# Patient Record
Sex: MALE | Race: ASIAN | NOT HISPANIC OR LATINO | URBAN - METROPOLITAN AREA
[De-identification: names, ages, dates, MRNs, and addresses within clinical notes are randomized per-mention and may not be internally consistent; named-entity substitution may affect disease eponyms.]

---

## 2017-09-05 ENCOUNTER — CONSULT (OUTPATIENT)
Dept: ONCOLOGY | Facility: CLINIC | Age: 77
End: 2017-09-05

## 2017-09-05 ENCOUNTER — LAB (OUTPATIENT)
Dept: LAB | Facility: HOSPITAL | Age: 77
End: 2017-09-05

## 2017-09-05 VITALS
DIASTOLIC BLOOD PRESSURE: 62 MMHG | HEIGHT: 66 IN | BODY MASS INDEX: 23.46 KG/M2 | WEIGHT: 146 LBS | HEART RATE: 70 BPM | SYSTOLIC BLOOD PRESSURE: 117 MMHG | RESPIRATION RATE: 20 BRPM | TEMPERATURE: 97.7 F

## 2017-09-05 DIAGNOSIS — C71.9 GBM (GLIOBLASTOMA MULTIFORME) (HCC): ICD-10-CM

## 2017-09-05 DIAGNOSIS — C71.9 GBM (GLIOBLASTOMA MULTIFORME) (HCC): Primary | ICD-10-CM

## 2017-09-05 LAB
ALBUMIN SERPL-MCNC: 3.9 G/DL (ref 3.2–4.8)
ALBUMIN/GLOB SERPL: 1.3 G/DL (ref 1.5–2.5)
ALP SERPL-CCNC: 75 U/L (ref 25–100)
ALT SERPL W P-5'-P-CCNC: 26 U/L (ref 7–40)
ANION GAP SERPL CALCULATED.3IONS-SCNC: 4 MMOL/L (ref 3–11)
AST SERPL-CCNC: 33 U/L (ref 0–33)
BILIRUB SERPL-MCNC: 0.4 MG/DL (ref 0.3–1.2)
BUN BLD-MCNC: 24 MG/DL (ref 9–23)
BUN/CREAT SERPL: 40 (ref 7–25)
CALCIUM SPEC-SCNC: 10.9 MG/DL (ref 8.7–10.4)
CHLORIDE SERPL-SCNC: 106 MMOL/L (ref 99–109)
CO2 SERPL-SCNC: 30 MMOL/L (ref 20–31)
CREAT BLD-MCNC: 0.6 MG/DL (ref 0.6–1.3)
ERYTHROCYTE [DISTWIDTH] IN BLOOD BY AUTOMATED COUNT: 20.7 % (ref 11.3–14.5)
GFR SERPL CREATININE-BSD FRML MDRD: 130 ML/MIN/1.73
GFR SERPL CREATININE-BSD FRML MDRD: >150 ML/MIN/1.73
GLOBULIN UR ELPH-MCNC: 3 GM/DL
GLUCOSE BLD-MCNC: 192 MG/DL (ref 70–100)
HCT VFR BLD AUTO: 33.7 % (ref 38.9–50.9)
HGB BLD-MCNC: 11 G/DL (ref 13.1–17.5)
LYMPHOCYTES # BLD AUTO: 1 10*3/MM3 (ref 0.6–4.8)
LYMPHOCYTES NFR BLD AUTO: 19.5 % (ref 24–44)
MCH RBC QN AUTO: 31.7 PG (ref 27–31)
MCHC RBC AUTO-ENTMCNC: 32.5 G/DL (ref 32–36)
MCV RBC AUTO: 97.4 FL (ref 80–99)
MONOCYTES # BLD AUTO: 0.3 10*3/MM3 (ref 0–1)
MONOCYTES NFR BLD AUTO: 5.8 % (ref 0–12)
NEUTROPHILS # BLD AUTO: 3.7 10*3/MM3 (ref 1.5–8.3)
NEUTROPHILS NFR BLD AUTO: 74.7 % (ref 41–71)
PLATELET # BLD AUTO: 76 10*3/MM3 (ref 150–450)
PMV BLD AUTO: 7.2 FL (ref 6–12)
POTASSIUM BLD-SCNC: 4.2 MMOL/L (ref 3.5–5.5)
PROT SERPL-MCNC: 6.9 G/DL (ref 5.7–8.2)
RBC # BLD AUTO: 3.46 10*6/MM3 (ref 4.2–5.76)
SODIUM BLD-SCNC: 140 MMOL/L (ref 132–146)
WBC NRBC COR # BLD: 4.9 10*3/MM3 (ref 3.5–10.8)

## 2017-09-05 PROCEDURE — 80053 COMPREHEN METABOLIC PANEL: CPT | Performed by: INTERNAL MEDICINE

## 2017-09-05 PROCEDURE — 36415 COLL VENOUS BLD VENIPUNCTURE: CPT

## 2017-09-05 PROCEDURE — 99204 OFFICE O/P NEW MOD 45 MIN: CPT | Performed by: INTERNAL MEDICINE

## 2017-09-05 PROCEDURE — 85025 COMPLETE CBC W/AUTO DIFF WBC: CPT

## 2017-09-05 RX ORDER — SODIUM CHLORIDE 9 MG/ML
250 INJECTION, SOLUTION INTRAVENOUS ONCE
Status: CANCELLED | OUTPATIENT
Start: 2017-10-05

## 2017-09-06 NOTE — PROGRESS NOTES
ID: 77 y.o. year old male from [facility] [id number]    PCP: Una Brooks MD    REFERRING PHYSICIAN: Self Reffered    Reason for Consultation: GBM    Dear Dr. Brooks    It is a pleasure to meet Mr. Peralta today.  As you know he is a very pleasant 77-year-old gentleman who was diagnosed with a glioblastoma multiforme in April 2017.  He underwent a biopsy with Dr. Padilla after which he was treated with radiation and concurrent Temodar at [facility].  He has been on maintenance Temodar at 160 mg/m² days 1 through 5 on a 28 day schedule on a 28 day schedule.  He also has a history of heart issues which he has a defibrillator in place.  His prior MRIs have been done at  with the help of his cardiologist at .  He has been receiving physical therapy and also has been wearing the Optune device fairly religiously.  Over the weekend he was having some issues with confusion and also became increasingly weak.  The patient's son called me to see if I can see him and also ask for advice regarding treatment going forward.  I recommended increasing his steroids to 4 mg twice a day and also place him on antibiotics.  There was some question.  When the patient had mild aspiration on chest x-ray at the urgent care Center.  He was also having difficulty with swallowing liquids easily.      Past Medical History:   Diagnosis Date   • Arthritis    • CHF (congestive heart failure)    • Diabetes mellitus    • DVT (deep vein thrombosis) in pregnancy    • GBM (glioblastoma multiforme)    • Hypertension    • Kidney stones        Past Surgical History:   Procedure Laterality Date   • BRAIN BIOPSY     • BRAIN SURGERY  04/2017   • CORONARY ARTERY BYPASS GRAFT     • LEG THROMBECTOMY/EMBOLECTOMY     • PACEMAKER IMPLANTATION         Social History     Social History   • Marital status:      Spouse name: N/A   • Number of children: N/A   • Years of education: N/A     Social History Main Topics   •  Smoking status: Never Smoker   • Smokeless tobacco: Never Used   • Alcohol use No   • Drug use: No   • Sexual activity: Defer     Other Topics Concern   • None     Social History Narrative       Family History   Problem Relation Age of Onset   • Diabetes Mother    • Heart disease Father        Review of Systems:    16 point review of systems was performed and reviewed and scanned into the EMR      Current Outpatient Prescriptions:   •  amoxicillin-clavulanate (AUGMENTIN) 875-125 MG per tablet, Take 1 tablet by mouth 2 (Two) Times a Day., Disp: , Rfl:   •  captopril (CAPOTEN) 25 MG tablet, Take 25 mg by mouth 2 (Two) Times a Day., Disp: , Rfl:   •  carvedilol (COREG) 12.5 MG tablet, Take 12.5 mg by mouth 2 (Two) Times a Day With Meals., Disp: , Rfl:   •  dexamethasone (DECADRON) 6 MG tablet, Take 6 mg by mouth 2 (Two) Times a Day With Meals. Not sure of dose, Disp: , Rfl:   •  docusate sodium (COLACE) 100 MG capsule, Take 100 mg by mouth 2 (Two) Times a Day., Disp: , Rfl:   •  enoxaparin (LOVENOX) 100 MG/ML solution syringe, Inject 40 mg under the skin Every 12 (Twelve) Hours. Not sure of dose, Disp: , Rfl:   •  furosemide (LASIX) 20 MG tablet, Take 20 mg by mouth 2 (Two) Times a Day., Disp: , Rfl:   •  glimepiride (AMARYL) 2 MG tablet, Take 2 mg by mouth Every Morning Before Breakfast., Disp: , Rfl:   •  insulin lispro (humaLOG) 100 UNIT/ML injection, Inject  under the skin 3 (Three) Times a Day Before Meals., Disp: , Rfl:   •  levETIRAcetam (KEPPRA) 500 MG tablet, Take 500 mg by mouth 2 (Two) Times a Day., Disp: , Rfl:   •  metFORMIN ER (GLUCOPHAGE-XR) 750 MG 24 hr tablet, Take 750 mg by mouth Daily With Breakfast., Disp: , Rfl:   •  pseudoephedrine-guaifenesin (MUCINEX D)  MG per 12 hr tablet, Take 1 tablet by mouth Every 12 (Twelve) Hours., Disp: , Rfl:     No Known Allergies    ECOG SCORE: 3    Objective     Vitals:    09/05/17 0917   BP: 117/62   Pulse: 70   Resp: 20   Temp: 97.7 °F (36.5 °C)        Physical Exam   Constitutional: He appears well-developed and well-nourished.   HENT:   Head: Normocephalic.   Right Ear: External ear normal.   Left Ear: External ear normal.   Eyes: EOM are normal.   Neck: Normal range of motion. Neck supple.   Cardiovascular: Normal rate and regular rhythm.    Pulmonary/Chest: Effort normal.   Abdominal: Soft.   Neurological:   Weak with gait abnormalities.   Skin: Skin is warm and dry.   Psychiatric: He has a normal mood and affect. His behavior is normal. Judgment and thought content normal.     Lab Results   Component Value Date    WBC 4.90 09/05/2017    HGB 11.0 (L) 09/05/2017    HCT 33.7 (L) 09/05/2017    MCV 97.4 09/05/2017    PLT 76 (L) 09/05/2017     Lab Results   Component Value Date    GLUCOSE 192 (H) 09/05/2017    BUN 24 (H) 09/05/2017    CREATININE 0.60 09/05/2017    EGFRIFNONA 130 09/05/2017    EGFRIFAFRI >150 09/05/2017    BCR 40.0 (H) 09/05/2017    K 4.2 09/05/2017    CO2 30.0 09/05/2017    CALCIUM 10.9 (H) 09/05/2017    ALBUMIN 3.90 09/05/2017    LABIL2 1.3 (L) 09/05/2017    AST 33 09/05/2017    ALT 26 09/05/2017         ASSESSMENT:    1. GBM:  Patient continue on Temodar for now.  I'm concerned about the thrombocytopenia and so I'm going to drop daily 5.  He should also continue wearing the OPtune device.  I'm going to order an MRI of his head to be done at [facility].  If his platelets are not recovering I will likely switch him to Avastin and may consider addition of Keytruda in that situation.  I feel  will not be able to tolerate another progression without significant sequela and having disease response and limiting progression would be ideal.  I also discussed with him and his sons extensively about the plan going forward.  He will continue his physical therapy for now.  Unfortunately the prognosis of this disease and is fairly poor. The patient's age and performance status is also a concern.    2.  Swallowing difficulty: I'm going  to schedule him to have  swallow evaluation at [facility] where he had his last one done.      Thank you for allowing me to participate in the care of this patient.    Yours sincerely,    Jovan Huntley MD  [facility]  Hematology and Oncology        Please note that portions of this note may have been completed with a voice recognition program. Efforts were made to edit the dictations, but occasionally words are mistranscribed.

## 2017-09-07 DIAGNOSIS — C71.9 GBM (GLIOBLASTOMA MULTIFORME) (HCC): Primary | ICD-10-CM

## 2017-09-07 RX ORDER — PROCHLORPERAZINE MALEATE 10 MG
10 TABLET ORAL EVERY 6 HOURS PRN
Qty: 90 TABLET | Refills: 5 | Status: SHIPPED | OUTPATIENT
Start: 2017-09-07

## 2017-09-07 RX ORDER — ONDANSETRON HYDROCHLORIDE 8 MG/1
8 TABLET, FILM COATED ORAL 3 TIMES DAILY PRN
Qty: 30 TABLET | Refills: 5 | Status: SHIPPED | OUTPATIENT
Start: 2017-09-07 | End: 2017-09-18

## 2017-09-13 ENCOUNTER — APPOINTMENT (OUTPATIENT)
Dept: ONCOLOGY | Facility: HOSPITAL | Age: 77
End: 2017-09-13

## 2017-09-13 DIAGNOSIS — C71.9 GBM (GLIOBLASTOMA MULTIFORME) (HCC): ICD-10-CM

## 2017-09-13 RX ORDER — PALONOSETRON 0.05 MG/ML
0.25 INJECTION, SOLUTION INTRAVENOUS ONCE
Status: CANCELLED | OUTPATIENT
Start: 2017-09-22

## 2017-09-13 RX ORDER — SODIUM CHLORIDE 9 MG/ML
250 INJECTION, SOLUTION INTRAVENOUS ONCE
Status: CANCELLED | OUTPATIENT
Start: 2017-09-22

## 2017-09-13 RX ORDER — ATROPINE SULFATE 1 MG/ML
0.25 INJECTION, SOLUTION INTRAMUSCULAR; INTRAVENOUS; SUBCUTANEOUS
Status: CANCELLED | OUTPATIENT
Start: 2017-09-22

## 2017-09-18 DIAGNOSIS — C71.9 GBM (GLIOBLASTOMA MULTIFORME) (HCC): ICD-10-CM

## 2017-09-18 RX ORDER — SODIUM CHLORIDE 9 MG/ML
250 INJECTION, SOLUTION INTRAVENOUS ONCE
Status: CANCELLED | OUTPATIENT
Start: 2017-10-12

## 2017-09-18 RX ORDER — SODIUM CHLORIDE 9 MG/ML
250 INJECTION, SOLUTION INTRAVENOUS ONCE
Status: CANCELLED | OUTPATIENT
Start: 2017-10-26

## 2017-09-21 ENCOUNTER — DOCUMENTATION (OUTPATIENT)
Dept: NUTRITION | Facility: HOSPITAL | Age: 77
End: 2017-09-21

## 2017-09-21 NOTE — PROGRESS NOTES
Oncology Nutrition Screening    Patient Name:  Bill Peralta  YOB: 1940  MRN: [id number]  Date:  09/21/17  Physician:  Dr. Sanchez    Type of Cancer Treatment:   Chemotherapy:  Type: Avastin  Treatment Schedule: 24-14 day cycles / starts with cycle #1 9/22/17    Patient Active Problem List   Diagnosis   • GBM (glioblastoma multiforme)       Current Outpatient Prescriptions   Medication Sig Dispense Refill   • amoxicillin-clavulanate (AUGMENTIN) 875-125 MG per tablet Take 1 tablet by mouth 2 (Two) Times a Day.     • captopril (CAPOTEN) 25 MG tablet Take 25 mg by mouth 2 (Two) Times a Day.     • carvedilol (COREG) 12.5 MG tablet Take 12.5 mg by mouth 2 (Two) Times a Day With Meals.     • dexamethasone (DECADRON) 6 MG tablet Take 6 mg by mouth 2 (Two) Times a Day With Meals. Not sure of dose     • docusate sodium (COLACE) 100 MG capsule Take 100 mg by mouth 2 (Two) Times a Day.     • enoxaparin (LOVENOX) 100 MG/ML solution syringe Inject 40 mg under the skin Every 12 (Twelve) Hours. Not sure of dose     • furosemide (LASIX) 20 MG tablet Take 20 mg by mouth 2 (Two) Times a Day.     • glimepiride (AMARYL) 2 MG tablet Take 2 mg by mouth Every Morning Before Breakfast.     • insulin lispro (humaLOG) 100 UNIT/ML injection Inject  under the skin 3 (Three) Times a Day Before Meals.     • levETIRAcetam (KEPPRA) 500 MG tablet Take 500 mg by mouth 2 (Two) Times a Day.     • metFORMIN ER (GLUCOPHAGE-XR) 750 MG 24 hr tablet Take 750 mg by mouth Daily With Breakfast.     • prochlorperazine (COMPAZINE) 10 MG tablet Take 1 tablet by mouth Every 6 (Six) Hours As Needed for Nausea. 90 tablet 5   • pseudoephedrine-guaifenesin (MUCINEX D)  MG per 12 hr tablet Take 1 tablet by mouth Every 12 (Twelve) Hours.       No current facility-administered medications for this visit.        Glycemic Risk:   IDDM - managed with insulin and oral hypoglycemia agent (glucophage)    Weight:   Height: 66 inches  Weight: 146 lbs.  Usual  Body Weight: NA lbs.   BMI: 23.6  Normal  Weight change not noted with chart review    Oral Food Intake:  Regular Diet - No Restrictions  Patient is having some recent difficulty with swallowing    Hydration Status:   How many 8 ounce glass of water of fluid do you drink per day?  To be assessed at time of consultation    Enteral Feeding:   NA    Nutrition Symptoms:   Altered Apetite  Problems Chewing/Swallowing  Fatigue    Activity:   Not feeling up to most things, but in bed less than half the day     reports that he has never smoked. He has never used smokeless tobacco. He reports that he does not drink alcohol or use illicit drugs.    Evaluation of Nutritional Risk:   Patient is identified at nutrition risk related to diagnosis, age, recent difficulty with swallowing and other noted nutritional impact symptoms.  Will see in consultation 9/22/17 when patient receives his first Avastin treatment.        Electronically signed by:  Paola Gonzalez RD  12:43 PM

## 2017-09-22 ENCOUNTER — APPOINTMENT (OUTPATIENT)
Dept: ONCOLOGY | Facility: HOSPITAL | Age: 77
End: 2017-09-22

## 2017-09-26 ENCOUNTER — APPOINTMENT (OUTPATIENT)
Dept: ONCOLOGY | Facility: HOSPITAL | Age: 77
End: 2017-09-26

## 2017-09-29 ENCOUNTER — APPOINTMENT (OUTPATIENT)
Dept: ONCOLOGY | Facility: HOSPITAL | Age: 77
End: 2017-09-29

## 2018-03-15 DIAGNOSIS — E83.52 HYPERCALCEMIA OF MALIGNANCY: ICD-10-CM

## 2018-03-15 NOTE — PROGRESS NOTES
78-year-old gentleman with glioblastoma multiforme he has been receiving palliative care at home and been relatively stable.  He had developed in the hospital at [facility] hypercalcemia of malignancy.  He was worked up for parathyroid issues and other endocrine problems and no other defect was found.  He was treated with Pamindronate as an inpatient and did well.  He is now at home and relatively stable until recently when he developed significant somnolence.  Lab checks were performed which showed a calcium of 12.2.  He is not a candidate for further chemotherapy or intervention from a treatment standpoint.  From a palliative standpoint controlling his calcium would be reasonable.  I have recommended Xgeva once a month to assist with hypercalcemia of malignancy.

## 2018-03-21 ENCOUNTER — APPOINTMENT (OUTPATIENT)
Dept: ONCOLOGY | Facility: HOSPITAL | Age: 78
End: 2018-03-21

## 2018-04-05 ENCOUNTER — LAB REQUISITION (OUTPATIENT)
Dept: LAB | Facility: HOSPITAL | Age: 78
End: 2018-04-05

## 2018-04-05 DIAGNOSIS — Z00.00 ROUTINE GENERAL MEDICAL EXAMINATION AT A HEALTH CARE FACILITY: ICD-10-CM

## 2018-04-05 LAB
BNP SERPL-MCNC: 264 PG/ML (ref 0–100)
CA-I SERPL ISE-MCNC: 1.49 MMOL/L (ref 1.12–1.32)

## 2018-04-05 PROCEDURE — 82330 ASSAY OF CALCIUM: CPT

## 2018-04-05 PROCEDURE — 83880 ASSAY OF NATRIURETIC PEPTIDE: CPT

## 2018-04-05 PROCEDURE — 83880 ASSAY OF NATRIURETIC PEPTIDE: CPT | Performed by: HOSPITALIST

## 2018-04-05 PROCEDURE — 82330 ASSAY OF CALCIUM: CPT | Performed by: HOSPITALIST

## 2018-04-19 ENCOUNTER — LAB REQUISITION (OUTPATIENT)
Dept: LAB | Facility: HOSPITAL | Age: 78
End: 2018-04-19

## 2018-04-19 DIAGNOSIS — Z00.00 ROUTINE GENERAL MEDICAL EXAMINATION AT A HEALTH CARE FACILITY: ICD-10-CM

## 2018-04-19 LAB
ALBUMIN SERPL-MCNC: 3 G/DL (ref 3.2–4.8)
ALBUMIN/GLOB SERPL: 1.5 G/DL (ref 1.5–2.5)
ALP SERPL-CCNC: 102 U/L (ref 25–100)
ALT SERPL W P-5'-P-CCNC: 102 U/L (ref 7–40)
ANION GAP SERPL CALCULATED.3IONS-SCNC: 4 MMOL/L (ref 3–11)
AST SERPL-CCNC: 63 U/L (ref 0–33)
BASOPHILS # BLD AUTO: 0 10*3/MM3 (ref 0–0.2)
BASOPHILS NFR BLD AUTO: 0 % (ref 0–1)
BILIRUB SERPL-MCNC: 0.4 MG/DL (ref 0.3–1.2)
BNP SERPL-MCNC: 275 PG/ML (ref 0–100)
BUN BLD-MCNC: 36 MG/DL (ref 9–23)
BUN/CREAT SERPL: 90 (ref 7–25)
CA-I SERPL ISE-MCNC: 1.46 MMOL/L (ref 1.12–1.32)
CALCIUM SPEC-SCNC: 9.5 MG/DL (ref 8.7–10.4)
CHLORIDE SERPL-SCNC: 102 MMOL/L (ref 99–109)
CO2 SERPL-SCNC: 29 MMOL/L (ref 20–31)
CREAT BLD-MCNC: 0.4 MG/DL (ref 0.6–1.3)
DEPRECATED RDW RBC AUTO: 73.8 FL (ref 37–54)
ELLIPTOCYTES BLD QL SMEAR: NORMAL
EOSINOPHIL # BLD AUTO: 0 10*3/MM3 (ref 0–0.3)
EOSINOPHIL NFR BLD AUTO: 0 % (ref 0–3)
ERYTHROCYTE [DISTWIDTH] IN BLOOD BY AUTOMATED COUNT: 21.6 % (ref 11.3–14.5)
GFR SERPL CREATININE-BSD FRML MDRD: >150 ML/MIN/1.73
GFR SERPL CREATININE-BSD FRML MDRD: >150 ML/MIN/1.73
GLOBULIN UR ELPH-MCNC: 2 GM/DL
GLUCOSE BLD-MCNC: 122 MG/DL (ref 70–100)
HCT VFR BLD AUTO: 39 % (ref 38.9–50.9)
HGB BLD-MCNC: 12.9 G/DL (ref 13.1–17.5)
IMM GRANULOCYTES # BLD: 0.03 10*3/MM3 (ref 0–0.03)
IMM GRANULOCYTES NFR BLD: 1.1 % (ref 0–0.6)
LYMPHOCYTES # BLD AUTO: 0.64 10*3/MM3 (ref 0.6–4.8)
LYMPHOCYTES NFR BLD AUTO: 23.5 % (ref 24–44)
MCH RBC QN AUTO: 30.9 PG (ref 27–31)
MCHC RBC AUTO-ENTMCNC: 33.1 G/DL (ref 32–36)
MCV RBC AUTO: 93.3 FL (ref 80–99)
MONOCYTES # BLD AUTO: 0.13 10*3/MM3 (ref 0–1)
MONOCYTES NFR BLD AUTO: 4.8 % (ref 0–12)
NEUTROPHILS # BLD AUTO: 1.92 10*3/MM3 (ref 1.5–8.3)
NEUTROPHILS NFR BLD AUTO: 70.6 % (ref 41–71)
PHOSPHATE SERPL-MCNC: 2.7 MG/DL (ref 2.4–5.1)
PLAT MORPH BLD: NORMAL
PLATELET # BLD AUTO: 47 10*3/MM3 (ref 150–450)
PMV BLD AUTO: ABNORMAL FL (ref 6–12)
POLYCHROMASIA BLD QL SMEAR: NORMAL
POTASSIUM BLD-SCNC: 4.6 MMOL/L (ref 3.5–5.5)
PROT SERPL-MCNC: 5 G/DL (ref 5.7–8.2)
RBC # BLD AUTO: 4.18 10*6/MM3 (ref 4.2–5.76)
SMUDGE CELLS BLD QL SMEAR: NORMAL
SODIUM BLD-SCNC: 135 MMOL/L (ref 132–146)
WBC NRBC COR # BLD: 2.72 10*3/MM3 (ref 3.5–10.8)

## 2018-04-19 PROCEDURE — 84100 ASSAY OF PHOSPHORUS: CPT | Performed by: HOSPITALIST

## 2018-04-19 PROCEDURE — 82330 ASSAY OF CALCIUM: CPT

## 2018-04-19 PROCEDURE — 83880 ASSAY OF NATRIURETIC PEPTIDE: CPT

## 2018-04-19 PROCEDURE — 80053 COMPREHEN METABOLIC PANEL: CPT | Performed by: HOSPITALIST

## 2018-04-19 PROCEDURE — 85007 BL SMEAR W/DIFF WBC COUNT: CPT | Performed by: HOSPITALIST

## 2018-04-19 PROCEDURE — 85025 COMPLETE CBC W/AUTO DIFF WBC: CPT | Performed by: HOSPITALIST

## 2018-04-19 PROCEDURE — 83880 ASSAY OF NATRIURETIC PEPTIDE: CPT | Performed by: HOSPITALIST

## 2018-04-19 PROCEDURE — 80053 COMPREHEN METABOLIC PANEL: CPT

## 2018-04-19 PROCEDURE — 85025 COMPLETE CBC W/AUTO DIFF WBC: CPT

## 2018-04-19 PROCEDURE — 85007 BL SMEAR W/DIFF WBC COUNT: CPT

## 2018-04-19 PROCEDURE — 82330 ASSAY OF CALCIUM: CPT | Performed by: HOSPITALIST

## 2018-04-19 PROCEDURE — 84100 ASSAY OF PHOSPHORUS: CPT
